# Patient Record
Sex: FEMALE | Race: ASIAN | NOT HISPANIC OR LATINO
[De-identification: names, ages, dates, MRNs, and addresses within clinical notes are randomized per-mention and may not be internally consistent; named-entity substitution may affect disease eponyms.]

---

## 2020-05-26 ENCOUNTER — APPOINTMENT (OUTPATIENT)
Dept: UROLOGY | Facility: CLINIC | Age: 50
End: 2020-05-26
Payer: COMMERCIAL

## 2020-05-26 VITALS
SYSTOLIC BLOOD PRESSURE: 114 MMHG | WEIGHT: 110 LBS | HEIGHT: 60 IN | BODY MASS INDEX: 21.6 KG/M2 | DIASTOLIC BLOOD PRESSURE: 72 MMHG | HEART RATE: 94 BPM | RESPIRATION RATE: 18 BRPM | TEMPERATURE: 97.5 F

## 2020-05-26 DIAGNOSIS — Z78.9 OTHER SPECIFIED HEALTH STATUS: ICD-10-CM

## 2020-05-26 LAB
APPEARANCE: CLEAR
BACTERIA: NEGATIVE
BILIRUBIN URINE: NEGATIVE
BLOOD URINE: NEGATIVE
COLOR: COLORLESS
GLUCOSE QUALITATIVE U: NEGATIVE
HYALINE CASTS: 0 /LPF
KETONES URINE: NEGATIVE
LEUKOCYTE ESTERASE URINE: NEGATIVE
MICROSCOPIC-UA: NORMAL
NITRITE URINE: NEGATIVE
PH URINE: 6.5
PROTEIN URINE: NEGATIVE
RED BLOOD CELLS URINE: 1 /HPF
SPECIFIC GRAVITY URINE: 1
SQUAMOUS EPITHELIAL CELLS: 1 /HPF
UROBILINOGEN URINE: NORMAL
WHITE BLOOD CELLS URINE: 1 /HPF

## 2020-05-26 PROCEDURE — 99204 OFFICE O/P NEW MOD 45 MIN: CPT | Mod: 25

## 2020-05-26 PROCEDURE — 76775 US EXAM ABDO BACK WALL LIM: CPT

## 2020-05-26 RX ORDER — METHENAMINE HIPPURATE 1 G/1
1 TABLET ORAL
Qty: 180 | Refills: 3 | Status: ACTIVE | COMMUNITY
Start: 2020-05-26 | End: 1900-01-01

## 2020-05-26 RX ORDER — AZELASTINE HYDROCHLORIDE 0.5 MG/ML
0.05 SOLUTION/ DROPS OPHTHALMIC
Qty: 6 | Refills: 0 | Status: ACTIVE | COMMUNITY
Start: 2019-10-07

## 2020-05-26 NOTE — PHYSICAL EXAM
[General Appearance - Well Developed] : well developed [General Appearance - Well Nourished] : well nourished [Normal Appearance] : normal appearance [Well Groomed] : well groomed [General Appearance - In No Acute Distress] : no acute distress [Edema] : no peripheral edema [Respiration, Rhythm And Depth] : normal respiratory rhythm and effort [Exaggerated Use Of Accessory Muscles For Inspiration] : no accessory muscle use [Abdomen Tenderness] : non-tender [Abdomen Soft] : soft [Costovertebral Angle Tenderness] : no ~M costovertebral angle tenderness [Normal Station and Gait] : the gait and station were normal for the patient's age [Urinary Bladder Findings] : the bladder was normal on palpation [] : no rash [No Focal Deficits] : no focal deficits [Oriented To Time, Place, And Person] : oriented to person, place, and time [Affect] : the affect was normal [Mood] : the mood was normal [Not Anxious] : not anxious [No Palpable Adenopathy] : no palpable adenopathy

## 2020-05-26 NOTE — ADDENDUM
[FreeTextEntry1] : Entered by Oliver Zelaya, acting as scribe for Dr. Geoffrey Eid.\par \par The documentation recorded by the scribe accurately reflects the service I personally performed and the decisions made by me.

## 2020-05-26 NOTE — LETTER BODY
[FreeTextEntry1] : Kye GUILLORY Ma, MD\par [address] [facility] [address]\[place] [address]\par [phone]\par \par Dear Dr. Reynolds,\par \par Reason for Visit: Recurrent UTI\par \par This is a 50 year-old working woman with history of COVID-19, presenting with UTI. Patient is referred for evaluation of her condition. Patient reports recurrent UTI. She has had 2 urinary tract infection the past 6 months. The course has been intermittent. The patient describes urethral discomfort. The location of the discomfort is urethra. She complains of urinary frequency. She has not tried vaginal estrogen cream previously. She is not sexually active. She reports of normal menstrual periods. Currently, patient denies any gross hematuria or urinary incontinence. The patient denies any aggravating or relieving factors. The patient denies any interference of function. The patient is entirely asymptomatic. All other review of systems are negative. She has no cancer in her family medical history. She has previously undergone appendectomy and  section.Past medical history, family history and social history were inquired and were noncontributory to current condition. The patient does not use tobacco or drink alcohol. Medications and allergies were reviewed. She is allergic to Sulfa drugs.\par \par On examination, the patient is a healthy-appearing woman in no acute distress. She is alert and oriented follows commands. She  has normal mood and affect. She is normocephalic. Oral no thrush. Neck is supple. Respirations are unlabored. Abdomen is soft and nontender. Liver is nonpalpable. Bladder is nonpalpable. No CVA tenderness. Neurologically she is grossly intact. No peripheral edema. Skin without gross abnormality.\par \par Assessment: Recurrent UTI. Left hydronephrosis.\par \par I counseled the patient. I discussed the various etiologies of her symptoms. I recommend she obtain urinalysis, urine culture, chlamydia testing, and ureaplasma/mycoplasma today to evaluate for the source of her recurrent UTI. I also recommend the patient begin a trial of Methenamine Hippurate and Vitamin C to prevent future UTI. I discussed the potential side effects of the medications. I counseled the patient on their uses and side effects. If the patient develops any side effects, the patient will discontinue the medication and contact me. Patient will proceed with renal ultrasound today. Risks and alternatives were discussed. I answered the patient questions. The patient will follow-up as directed and will contact me with any questions or concerns. Thank you for the opportunity to participate in the care of Ms. SMITH. I will keep you updated on her progress. \par \par Plan: Trial of Methenamine Hippurate and Vitamin C. Urinalysis. Culture. Chlamydia/GC amplification. Ureaplasma/Mycoplasma. Renal ultrasound. CT abdomen and pelvis. Follow up in 3 months.\par \par I personally reviewed renal ultrasound with the patient today. Images demonstrated mild dilated collecting system visualized in the left kidney, left ureteral jet visualized. Both kidneys appear normal in size and echogenicity without any stones or solid masses visualized.\par \par Her renal ultrasound today demonstrated evidence of left hydronephrosis. I recommend she obtain CT abdomen and pelvis to evaluate for the source of her hydronephrosis.

## 2020-05-27 LAB
BACTERIA UR CULT: NORMAL
C TRACH RRNA SPEC QL NAA+PROBE: NOT DETECTED
N GONORRHOEA RRNA SPEC QL NAA+PROBE: NOT DETECTED
SOURCE AMPLIFICATION: NORMAL

## 2020-06-06 LAB
MYCOPLASMA HOMINIS CULTURE: NORMAL
UREA SPECIMEN SOURCE: NORMAL
UREAPLASMA CULTURE: NORMAL
UREAPLASMA, PRELIMINARY CULTURE: NORMAL

## 2020-06-30 ENCOUNTER — APPOINTMENT (OUTPATIENT)
Dept: UROLOGY | Facility: CLINIC | Age: 50
End: 2020-06-30
Payer: COMMERCIAL

## 2020-06-30 VITALS
BODY MASS INDEX: 21.29 KG/M2 | SYSTOLIC BLOOD PRESSURE: 102 MMHG | DIASTOLIC BLOOD PRESSURE: 66 MMHG | HEART RATE: 82 BPM | TEMPERATURE: 98 F | WEIGHT: 109 LBS | RESPIRATION RATE: 18 BRPM | OXYGEN SATURATION: 97 %

## 2020-06-30 LAB
APPEARANCE: CLEAR
BACTERIA: NEGATIVE
BILIRUBIN URINE: NEGATIVE
BLOOD URINE: NEGATIVE
COLOR: COLORLESS
GLUCOSE QUALITATIVE U: NEGATIVE
HYALINE CASTS: 0 /LPF
KETONES URINE: NEGATIVE
LEUKOCYTE ESTERASE URINE: ABNORMAL
MICROSCOPIC-UA: NORMAL
NITRITE URINE: NEGATIVE
PH URINE: 6.5
PROTEIN URINE: NEGATIVE
RED BLOOD CELLS URINE: 4 /HPF
SPECIFIC GRAVITY URINE: 1.01
SQUAMOUS EPITHELIAL CELLS: 2 /HPF
UROBILINOGEN URINE: NORMAL
WHITE BLOOD CELLS URINE: 1 /HPF

## 2020-06-30 PROCEDURE — 99213 OFFICE O/P EST LOW 20 MIN: CPT

## 2020-06-30 NOTE — LETTER BODY
[FreeTextEntry1] : Kye GUILLORY Ma, MD\par [address] [facility] [address]\[place] [address]\par [phone]\par \par Dear Dr. Reynolds,\par \par Reason for Visit: Recurrent UTI\par \par This is a 50 year-old working woman with history of COVID-19, presenting with UTI. Patient is referred for evaluation of her condition. Patient reports recurrent UTI. She has had 2 urinary tract infection the past 6 months. The course has been intermittent. The patient describes urethral discomfort. The location of the discomfort is urethra. She complains of urinary frequency. She has not tried vaginal estrogen cream previously. She is not sexually active. She reports of normal menstrual periods. Currently, patient denies any gross hematuria or urinary incontinence. The patient denies any aggravating or relieving factors. She returns for followup after completing her CT scan. The patient denies any interference of function. The patient is entirely asymptomatic. All other review of systems are negative. She has no cancer in her family medical history. She has previously undergone appendectomy and  section.Past medical history, family history and social history were inquired and were noncontributory to current condition. The patient does not use tobacco or drink alcohol. Medications and allergies were reviewed. She is allergic to Sulfa drugs.\par \par On examination, the patient is a healthy-appearing woman in no acute distress. She is alert and oriented follows commands. She has normal mood and affect. She is normocephalic. Oral no thrush. Neck is supple. Respirations are unlabored. Abdomen is soft and nontender. Liver is nonpalpable. Bladder is nonpalpable. No CVA tenderness. Neurologically she is grossly intact. No peripheral edema. Skin without gross abnormality.\par \par Her recent urinalysis was unremarkable.\par \par I personally reviewed the CT scan with patient today. CT scan demonstrated malrotated right kidney. There is no hydronephrosis. There is a fibroid uterus. \par \par Assessment: Recurrent UTI. Malrotated right kidney. \par \par I counseled the patient. I discussed the various etiologies of her symptoms. I recommend she repeat urinalysis. I also recommend the patient continue methenamine Hippurate and Vitamin C to prevent future UTI. I discussed the potential side effects of the medications. I counseled the patient on their uses and side effects. If the patient develops any side effects, the patient will discontinue the medication and contact me. Patient will proceed with renal ultrasound today. Risks and alternatives were discussed. I answered the patient questions. The patient will follow-up as directed and will contact me with any questions or concerns. Thank you for the opportunity to participate in the care of Ms. SMITH. I will keep you updated on her progress. \par \par Plan: Continue Methenamine Hippurate and Vitamin C. Urinalysis. Culture. Chlamydia/GC amplification. Ureaplasma/Mycoplasma. Renal ultrasound. Followup in 6 months. \par

## 2020-07-01 LAB — BACTERIA UR CULT: NORMAL

## 2020-10-27 ENCOUNTER — APPOINTMENT (OUTPATIENT)
Dept: UROLOGY | Facility: CLINIC | Age: 50
End: 2020-10-27
Payer: COMMERCIAL

## 2020-10-27 VITALS
RESPIRATION RATE: 18 BRPM | TEMPERATURE: 97.6 F | SYSTOLIC BLOOD PRESSURE: 106 MMHG | WEIGHT: 112 LBS | HEART RATE: 104 BPM | BODY MASS INDEX: 21.87 KG/M2 | OXYGEN SATURATION: 95 % | DIASTOLIC BLOOD PRESSURE: 69 MMHG

## 2020-10-27 DIAGNOSIS — A49.9 URINARY TRACT INFECTION, SITE NOT SPECIFIED: ICD-10-CM

## 2020-10-27 DIAGNOSIS — N39.0 URINARY TRACT INFECTION, SITE NOT SPECIFIED: ICD-10-CM

## 2020-10-27 PROCEDURE — 99072 ADDL SUPL MATRL&STAF TM PHE: CPT

## 2020-10-27 PROCEDURE — 99214 OFFICE O/P EST MOD 30 MIN: CPT

## 2020-10-27 NOTE — LETTER BODY
[FreeTextEntry1] : Kye GUILLORY Ma, MD\par [address] [facility] [address]\[place] [address]\par [phone]\par \par Dear Dr. Reynolds,\par \par Reason for Visit: Recurrent UTI. Malrotated right kidney. Pyuria.\par \par This is a 50 year-old working woman with history of COVID-19, malrotated right kidney and recurrent UTI, presenting with pyuria. Her CT scan in June demonstrated malrotated right kidney without hydronephrosis, and a fibroid uterus. She returns today for follow-up. Since the patient's last visit, she obtained a urinalysis, which demonstrated no evidence of hematuria. However, on urine microscopy there was evidence of white blood cells. Patient is entirely asymptomatic. She denies any gross hematuria, dysuria or urinary incontinence. The patient reports being stressed recently due to her job. All other review of systems are negative. Past medical history, family history and social history were unchanged. Medications and allergies were reviewed. She is allergic to Sulfa drugs.\par \par On examination, the patient is a healthy-appearing woman in no acute distress. She is alert and oriented follows commands. She has normal mood and affect. She is normocephalic. Oral no thrush. Neck is supple. Respirations are unlabored. Abdomen is soft and nontender. Liver is nonpalpable. Bladder is nonpalpable. No CVA tenderness. Neurologically she is grossly intact. No peripheral edema. Skin without gross abnormality.\par \par Her recent urinalysis demonstrated 10-20 WBC/HPF.\par \par Assessment: Recurrent UTI. Malrotated right kidney. Pyuria.\par \par I counseled the patient on the various etiologies of the pyuria. I discussed the risk. Given the absence of urinary symptoms, I recommended the patient repeat the urinalysis and obtain a urine culture prior to treatment. I also recommended she obtain chlamydia/GC amplification, quantiferon plus TB, and ureaplasma/mycoplasma genital culture for further evaluation of her condition. In terms of her recurrent UTI, I recommended she continue taking prophylactic Methenamine Hippurate and Vitamin C to prevent future infection. Risks and alternatives were discussed. I answered the patient questions. The patient will follow-up as directed and will contact me with any questions or concerns. Thank you for the opportunity to participate in the care of Ms. SMITH. I will keep you updated on her progress. \par \par Plan: Continue Methenamine Hippurate and Vitamin C. Urinalysis. Culture. Quantiferon plus TB. Chlamydia/GC amplification. Ureaplasma/Mycoplasma. Follow-up as directed.

## 2020-10-27 NOTE — ADDENDUM
[FreeTextEntry1] : Entered by Praveen Redding, acting as scribe for Dr. Geoffrey Eid.\par \par The documentation recorded by the scribe accurately reflects the service I personally performed and the decisions made by me.\par

## 2020-10-28 LAB
C TRACH RRNA SPEC QL NAA+PROBE: NOT DETECTED
N GONORRHOEA RRNA SPEC QL NAA+PROBE: NOT DETECTED
SOURCE AMPLIFICATION: NORMAL

## 2020-10-30 ENCOUNTER — NON-APPOINTMENT (OUTPATIENT)
Age: 50
End: 2020-10-30

## 2020-10-30 LAB
M TB IFN-G BLD-IMP: NEGATIVE
QUANTIFERON TB PLUS MITOGEN MINUS NIL: 8.17 IU/ML
QUANTIFERON TB PLUS NIL: 0.03 IU/ML
QUANTIFERON TB PLUS TB1 MINUS NIL: -0.01 IU/ML
QUANTIFERON TB PLUS TB2 MINUS NIL: -0.01 IU/ML

## 2020-11-01 LAB
APPEARANCE: ABNORMAL
BACTERIA UR CULT: NORMAL
BACTERIA: NEGATIVE
BILIRUBIN URINE: NEGATIVE
BLOOD URINE: NEGATIVE
COLOR: NORMAL
GLUCOSE QUALITATIVE U: NEGATIVE
HYALINE CASTS: 0 /LPF
KETONES URINE: NEGATIVE
LEUKOCYTE ESTERASE URINE: ABNORMAL
MICROSCOPIC-UA: NORMAL
NITRITE URINE: NEGATIVE
PH URINE: 6.5
PROTEIN URINE: NEGATIVE
RED BLOOD CELLS URINE: 2 /HPF
SPECIFIC GRAVITY URINE: 1.01
SQUAMOUS EPITHELIAL CELLS: 3 /HPF
UROBILINOGEN URINE: NORMAL
WHITE BLOOD CELLS URINE: 3 /HPF

## 2020-11-04 ENCOUNTER — NON-APPOINTMENT (OUTPATIENT)
Age: 50
End: 2020-11-04

## 2020-12-23 PROBLEM — N39.0 BACTERIAL UTI: Status: RESOLVED | Noted: 2020-05-26 | Resolved: 2020-12-23

## 2021-01-05 ENCOUNTER — APPOINTMENT (OUTPATIENT)
Dept: UROLOGY | Facility: CLINIC | Age: 51
End: 2021-01-05
Payer: COMMERCIAL

## 2021-01-05 ENCOUNTER — TRANSCRIPTION ENCOUNTER (OUTPATIENT)
Age: 51
End: 2021-01-05

## 2021-01-05 VITALS
WEIGHT: 110 LBS | BODY MASS INDEX: 21.48 KG/M2 | DIASTOLIC BLOOD PRESSURE: 65 MMHG | HEART RATE: 87 BPM | TEMPERATURE: 98.3 F | RESPIRATION RATE: 18 BRPM | OXYGEN SATURATION: 97 % | SYSTOLIC BLOOD PRESSURE: 106 MMHG

## 2021-01-05 PROCEDURE — 99213 OFFICE O/P EST LOW 20 MIN: CPT

## 2021-01-05 PROCEDURE — 99072 ADDL SUPL MATRL&STAF TM PHE: CPT

## 2021-01-05 NOTE — LETTER BODY
[FreeTextEntry1] : Kye GUILLORY Ma, MD\par [address] [facility] [address]\[place] [address]\par [phone]\par \par Dear Dr. Reynolds,\par \par Reason for Visit: Recurrent UTI. Malrotated right kidney. Pyuria.\par \par This is a 50 year-old working woman with history of COVID-19, malrotated right kidney, previous pyuria, and recurrent UTI. Her CT scan in June 2020 demonstrated malrotated right kidney without hydronephrosis, and a fibroid uterus. The patinet returns today for follow-up. Since she was last seen, the patient reports taking Methenamine hippurate and Vitamin C regularly without any side effects or difficulties. She denies any urinary complaints or difficulties. The patient reports that she is doing well. Patient is entirely asymptomatic. She denies any gross hematuria, dysuria or urinary incontinence. All other review of systems are negative. Past medical history, family history and social history were unchanged. Medications and allergies were reviewed. She is allergic to Sulfa drugs.\par \par On examination, the patient is a healthy-appearing woman in no acute distress. She is alert and oriented follows commands. She has normal mood and affect. She is normocephalic. Oral no thrush. Neck is supple. Respirations are unlabored. Abdomen is soft and nontender. Liver is nonpalpable. Bladder is nonpalpable. No CVA tenderness. Neurologically she is grossly intact. No peripheral edema. Skin without gross abnormality.\par \par Her urinalysis was unremarkable. Her urine culture was negative. Her chlamydia/GC amplification was negative. Her ureaplasma/mycoplasma genital culture was negative. Her quantiferon plus TB was negative.\par \par Assessment: Recurrent UTI. Malrotated right kidney. Pyuria, resolved.\par \par I counseled the patient. She is doing well. In terms of her recurrent UTI, I recommended she continue taking Methenamine hippurate and Vitamin C for 3 months, then she can discontinue the medication. She will repeat urine culture today to evaluate for infection. The patient will follow-up in 1 year to ensure stability. Risks and alternatives were discussed. I answered the patient questions. The patient will follow-up as directed and will contact me with any questions or concerns. Thank you for the opportunity to participate in the care of Ms. SMITH. I will keep you updated on her progress.\par \par Plan: Continue Methenamine Hippurate and Vitamin C for 3 months. Urine culture. Follow-up in 1 year.

## 2021-01-06 LAB — BACTERIA UR CULT: NORMAL

## 2021-12-17 ENCOUNTER — NON-APPOINTMENT (OUTPATIENT)
Age: 51
End: 2021-12-17

## 2022-01-14 ENCOUNTER — APPOINTMENT (OUTPATIENT)
Dept: UROLOGY | Facility: CLINIC | Age: 52
End: 2022-01-14
Payer: COMMERCIAL

## 2022-01-14 VITALS
TEMPERATURE: 97.8 F | RESPIRATION RATE: 18 BRPM | BODY MASS INDEX: 22.07 KG/M2 | DIASTOLIC BLOOD PRESSURE: 65 MMHG | HEART RATE: 73 BPM | WEIGHT: 113 LBS | OXYGEN SATURATION: 95 % | SYSTOLIC BLOOD PRESSURE: 112 MMHG

## 2022-01-14 DIAGNOSIS — U07.1 COVID-19: ICD-10-CM

## 2022-01-14 DIAGNOSIS — N13.30 UNSPECIFIED HYDRONEPHROSIS: ICD-10-CM

## 2022-01-14 DIAGNOSIS — R35.0 FREQUENCY OF MICTURITION: ICD-10-CM

## 2022-01-14 DIAGNOSIS — Z00.00 ENCOUNTER FOR GENERAL ADULT MEDICAL EXAMINATION W/OUT ABNORMAL FINDINGS: ICD-10-CM

## 2022-01-14 PROCEDURE — 99213 OFFICE O/P EST LOW 20 MIN: CPT

## 2022-01-14 NOTE — ADDENDUM
[FreeTextEntry1] : Entered by Harsh Dorsey, acting as scribe for Dr. Geoffrey Eid.\par \par The documentation recorded by the scribe accurately reflects the service I personally performed and the decisions made by me.

## 2022-01-14 NOTE — HISTORY OF PRESENT ILLNESS
[FreeTextEntry1] : Please refer to URO Consult note \par \par Patient returns today for follow-up.  She is concerned about possible urinary tract infection.  She is completely asymptomatic.  She has been off methenamine for over a year.  \par Urinalysis demonstrated evidence of no pyuria or hematuria.\par I reassured the patient.  Encourage hydration.  Repeat urinalysis.  Follow-up as directed.

## 2022-01-14 NOTE — LETTER BODY
[FreeTextEntry1] : Kye GUILLORY Ma, MD\par [address] [facility] [address]\[place] [address]\par [phone]\par \par Dear Dr. Reynolds,\par \par Reason for Visit: Recurrent UTI. Malrotated right kidney. Previous pyuria.\par \par This is a 51 year-old working woman with history of COVID-19, malrotated right kidney, previous pyuria, and recurrent UTI. Her CT scan in June 2020 demonstrated malrotated right kidney without hydronephrosis, and a fibroid uterus. The patient returns today for follow-up. Since she was last seen, the patient reports she has discontinued Methenamine hippurate and Vitamin C for over a year. The patient expresses concern about possible UTI.  She is entirely asymptomatic.  The patient denies any hematuria, dysuria or urinary incontinence.  All other review of systems are negative. Past medical history, family history and social history were unchanged. Medications and allergies were reviewed. She is allergic to Sulfa drugs.\par \par On examination, the patient is a healthy-appearing woman in no acute distress. She is alert and oriented follows commands. She has normal mood and affect. She is normocephalic. Oral no thrush. Neck is supple. Respirations are unlabored. Abdomen is soft and nontender. Liver is nonpalpable. Bladder is nonpalpable. No CVA tenderness. Neurologically she is grossly intact. No peripheral edema. Skin without gross abnormality.\par \par Her urine culture in January 2021 was negative. \par \par Assessment: Recurrent UTI. Malrotated right kidney. \par \par I counseled the patient.  In terms of her recurrent UTI, her previous urinalysis was negative for any pyuria or hematuria.  I reassured the patient. I recommended the patient repeat urinalysis and urine culture today to evaluate for infection. I encouraged hydration. Patient understands that if she develops gross hematuria or any urinary discomfort, she will contact me for further evaluation. Risks and alternatives were discussed. I answered the patient questions. The patient will follow-up as directed and will contact me with any questions or concerns. Thank you for the opportunity to participate in the care of Ms. SMITH. I will keep you updated on her progress. \par \par Plan: Urinalysis. Urine culture. Follow-up as directed.

## 2022-01-15 LAB
APPEARANCE: CLEAR
BACTERIA: NEGATIVE
BILIRUBIN URINE: NEGATIVE
BLOOD URINE: NEGATIVE
COLOR: YELLOW
GLUCOSE QUALITATIVE U: NEGATIVE
HYALINE CASTS: 0 /LPF
KETONES URINE: NEGATIVE
LEUKOCYTE ESTERASE URINE: NEGATIVE
MICROSCOPIC-UA: NORMAL
NITRITE URINE: NEGATIVE
PH URINE: 6.5
PROTEIN URINE: NEGATIVE
RED BLOOD CELLS URINE: 3 /HPF
SPECIFIC GRAVITY URINE: 1.01
SQUAMOUS EPITHELIAL CELLS: 1 /HPF
UROBILINOGEN URINE: NORMAL
WHITE BLOOD CELLS URINE: 1 /HPF

## 2022-01-16 LAB — BACTERIA UR CULT: NORMAL

## 2022-01-20 ENCOUNTER — NON-APPOINTMENT (OUTPATIENT)
Age: 52
End: 2022-01-20

## 2022-01-20 ENCOUNTER — APPOINTMENT (OUTPATIENT)
Dept: OBGYN | Facility: CLINIC | Age: 52
End: 2022-01-20
Payer: COMMERCIAL

## 2022-01-20 VITALS
DIASTOLIC BLOOD PRESSURE: 73 MMHG | SYSTOLIC BLOOD PRESSURE: 116 MMHG | HEIGHT: 60 IN | BODY MASS INDEX: 22.19 KG/M2 | WEIGHT: 113 LBS

## 2022-01-20 DIAGNOSIS — Z80.49 FAMILY HISTORY OF MALIGNANT NEOPLASM OF OTHER GENITAL ORGANS: ICD-10-CM

## 2022-01-20 DIAGNOSIS — N39.0 URINARY TRACT INFECTION, SITE NOT SPECIFIED: ICD-10-CM

## 2022-01-20 PROCEDURE — 82270 OCCULT BLOOD FECES: CPT

## 2022-01-20 PROCEDURE — 58120 DILATION AND CURETTAGE: CPT

## 2022-01-20 PROCEDURE — 99386 PREV VISIT NEW AGE 40-64: CPT | Mod: 25

## 2022-01-20 RX ORDER — BROMPHENIRAMINE MALEATE, PSEUDOEPHEDRINE HYDROCHLORIDE, 2; 30; 10 MG/5ML; MG/5ML; MG/5ML
30-2-10 SYRUP ORAL
Qty: 200 | Refills: 0 | Status: DISCONTINUED | COMMUNITY
Start: 2020-04-20 | End: 2022-01-20

## 2022-01-20 RX ORDER — ALBUTEROL SULFATE 90 UG/1
108 (90 BASE) AEROSOL, METERED RESPIRATORY (INHALATION)
Qty: 18 | Refills: 0 | Status: DISCONTINUED | COMMUNITY
Start: 2020-03-25 | End: 2022-01-20

## 2022-01-20 RX ORDER — CIPROFLOXACIN HYDROCHLORIDE 500 MG/1
500 TABLET, FILM COATED ORAL
Qty: 14 | Refills: 0 | Status: DISCONTINUED | COMMUNITY
Start: 2020-05-19 | End: 2022-01-20

## 2022-01-20 RX ORDER — BENZONATATE 100 MG/1
100 CAPSULE ORAL
Qty: 20 | Refills: 0 | Status: DISCONTINUED | COMMUNITY
Start: 2020-04-07 | End: 2022-01-20

## 2022-01-26 LAB
CYTOLOGY CVX/VAG DOC THIN PREP: NORMAL
ESTRADIOL SERPL-MCNC: 177 PG/ML
FSH SERPL-MCNC: 3.2 IU/L
HPV HIGH+LOW RISK DNA PNL CVX: NOT DETECTED
LH SERPL-ACNC: 5.2 IU/L
T4 FREE SERPL-MCNC: 1.1 NG/DL
TSH SERPL-ACNC: 1.08 UIU/ML

## 2022-01-26 RX ORDER — MISOPROSTOL 200 UG/1
200 TABLET ORAL
Qty: 2 | Refills: 0 | Status: ACTIVE | COMMUNITY
Start: 2022-01-26 | End: 1900-01-01

## 2022-02-15 ENCOUNTER — APPOINTMENT (OUTPATIENT)
Dept: OBGYN | Facility: CLINIC | Age: 52
End: 2022-02-15
Payer: COMMERCIAL

## 2022-02-15 ENCOUNTER — ASOB RESULT (OUTPATIENT)
Age: 52
End: 2022-02-15

## 2022-02-15 PROCEDURE — 77080 DXA BONE DENSITY AXIAL: CPT

## 2022-02-15 PROCEDURE — 76830 TRANSVAGINAL US NON-OB: CPT

## 2022-02-18 ENCOUNTER — NON-APPOINTMENT (OUTPATIENT)
Age: 52
End: 2022-02-18

## 2022-03-24 ENCOUNTER — APPOINTMENT (OUTPATIENT)
Dept: OBGYN | Facility: CLINIC | Age: 52
End: 2022-03-24
Payer: COMMERCIAL

## 2022-03-24 ENCOUNTER — ASOB RESULT (OUTPATIENT)
Age: 52
End: 2022-03-24

## 2022-03-24 LAB — HCG UR QL: NEGATIVE

## 2022-03-24 PROCEDURE — 76856 US EXAM PELVIC COMPLETE: CPT

## 2022-03-24 PROCEDURE — 58100 BIOPSY OF UTERUS LINING: CPT

## 2022-04-06 ENCOUNTER — APPOINTMENT (OUTPATIENT)
Dept: OBGYN | Facility: CLINIC | Age: 52
End: 2022-04-06
Payer: COMMERCIAL

## 2022-04-06 ENCOUNTER — ASOB RESULT (OUTPATIENT)
Age: 52
End: 2022-04-06

## 2022-04-06 DIAGNOSIS — N93.9 ABNORMAL UTERINE AND VAGINAL BLEEDING, UNSPECIFIED: ICD-10-CM

## 2022-04-06 PROCEDURE — 76831 ECHO EXAM UTERUS: CPT

## 2022-04-06 PROCEDURE — 58340 CATHETER FOR HYSTEROGRAPHY: CPT

## 2023-01-23 ENCOUNTER — APPOINTMENT (OUTPATIENT)
Dept: OBGYN | Facility: CLINIC | Age: 53
End: 2023-01-23
Payer: COMMERCIAL

## 2023-01-23 VITALS
DIASTOLIC BLOOD PRESSURE: 72 MMHG | HEIGHT: 60 IN | HEART RATE: 85 BPM | WEIGHT: 107 LBS | BODY MASS INDEX: 21.01 KG/M2 | SYSTOLIC BLOOD PRESSURE: 114 MMHG

## 2023-01-23 DIAGNOSIS — M85.80 OTHER SPECIFIED DISORDERS OF BONE DENSITY AND STRUCTURE, UNSPECIFIED SITE: ICD-10-CM

## 2023-01-23 PROCEDURE — 82270 OCCULT BLOOD FECES: CPT

## 2023-01-23 PROCEDURE — 99396 PREV VISIT EST AGE 40-64: CPT

## 2023-01-24 LAB — HPV HIGH+LOW RISK DNA PNL CVX: NOT DETECTED

## 2023-01-25 ENCOUNTER — ASOB RESULT (OUTPATIENT)
Age: 53
End: 2023-01-25

## 2023-01-25 ENCOUNTER — APPOINTMENT (OUTPATIENT)
Dept: OBGYN | Facility: CLINIC | Age: 53
End: 2023-01-25
Payer: COMMERCIAL

## 2023-01-25 PROCEDURE — 76830 TRANSVAGINAL US NON-OB: CPT

## 2023-01-25 PROCEDURE — 76856 US EXAM PELVIC COMPLETE: CPT | Mod: 59

## 2023-01-26 LAB — CYTOLOGY CVX/VAG DOC THIN PREP: NORMAL

## 2024-01-31 ENCOUNTER — APPOINTMENT (OUTPATIENT)
Dept: OBGYN | Facility: CLINIC | Age: 54
End: 2024-01-31
Payer: COMMERCIAL

## 2024-01-31 VITALS
WEIGHT: 110 LBS | BODY MASS INDEX: 21.6 KG/M2 | DIASTOLIC BLOOD PRESSURE: 64 MMHG | HEIGHT: 60 IN | SYSTOLIC BLOOD PRESSURE: 103 MMHG

## 2024-01-31 DIAGNOSIS — D21.9 BENIGN NEOPLASM OF CONNECTIVE AND OTHER SOFT TISSUE, UNSPECIFIED: ICD-10-CM

## 2024-01-31 DIAGNOSIS — N95.2 POSTMENOPAUSAL ATROPHIC VAGINITIS: ICD-10-CM

## 2024-01-31 PROCEDURE — 82270 OCCULT BLOOD FECES: CPT

## 2024-01-31 PROCEDURE — 99396 PREV VISIT EST AGE 40-64: CPT

## 2024-01-31 RX ORDER — ESTRADIOL 10 UG/1
10 TABLET, FILM COATED VAGINAL
Qty: 24 | Refills: 3 | Status: ACTIVE | COMMUNITY
Start: 2024-01-31 | End: 1900-01-01

## 2024-01-31 RX ORDER — ESTRADIOL 0.1 MG/G
0.1 CREAM VAGINAL
Qty: 1 | Refills: 3 | Status: ACTIVE | COMMUNITY
Start: 2024-01-31 | End: 1900-01-01

## 2024-02-01 LAB — HPV HIGH+LOW RISK DNA PNL CVX: NOT DETECTED

## 2024-02-07 LAB — CYTOLOGY CVX/VAG DOC THIN PREP: ABNORMAL

## 2024-02-13 ENCOUNTER — APPOINTMENT (OUTPATIENT)
Dept: OBGYN | Facility: CLINIC | Age: 54
End: 2024-02-13

## 2024-02-18 ENCOUNTER — NON-APPOINTMENT (OUTPATIENT)
Age: 54
End: 2024-02-18

## 2024-02-23 ENCOUNTER — ASOB RESULT (OUTPATIENT)
Age: 54
End: 2024-02-23

## 2024-02-23 ENCOUNTER — APPOINTMENT (OUTPATIENT)
Dept: OBGYN | Facility: CLINIC | Age: 54
End: 2024-02-23
Payer: COMMERCIAL

## 2024-02-23 PROCEDURE — 76830 TRANSVAGINAL US NON-OB: CPT

## 2024-03-19 ENCOUNTER — APPOINTMENT (OUTPATIENT)
Dept: OBGYN | Facility: CLINIC | Age: 54
End: 2024-03-19
Payer: COMMERCIAL

## 2024-03-19 VITALS — SYSTOLIC BLOOD PRESSURE: 105 MMHG | DIASTOLIC BLOOD PRESSURE: 64 MMHG

## 2024-03-19 DIAGNOSIS — Z01.419 ENCOUNTER FOR GYNECOLOGICAL EXAMINATION (GENERAL) (ROUTINE) W/OUT ABNORMAL FINDINGS: ICD-10-CM

## 2024-03-19 DIAGNOSIS — R87.615 UNSATISFACTORY CYTOLOGIC SMEAR OF CERVIX: ICD-10-CM

## 2024-03-19 PROCEDURE — 99212 OFFICE O/P EST SF 10 MIN: CPT

## 2024-03-19 NOTE — HISTORY OF PRESENT ILLNESS
[FreeTextEntry1] : 03/19/2024. JESSY SMITH 54 year old female presents for a f/u gyn visit for a re-pap   She feels well and offers no complaints.

## 2024-03-19 NOTE — PLAN
[FreeTextEntry1] : 54 year old female pt presents for a re-pap  RTO in 1 year for annual or PRN       This note was written by Rickie March on 03/19/2024 acting as scribe for Dr. Makayla Duvall   I,  Dr. Makayla Duvall, have read and attest that all the information, medical decision making and medical instructions within are true and accurate.

## 2024-03-19 NOTE — REASON FOR VISIT
[Follow-Up] : a follow-up evaluation of Pt on aspirin 81 mg , advised by cardio to stop aspirin 5 days prior to surgery

## 2024-03-20 LAB — HPV HIGH+LOW RISK DNA PNL CVX: NOT DETECTED

## 2024-03-22 LAB — CYTOLOGY CVX/VAG DOC THIN PREP: NORMAL

## 2025-01-02 ENCOUNTER — NON-APPOINTMENT (OUTPATIENT)
Age: 55
End: 2025-01-02

## 2025-01-03 ENCOUNTER — APPOINTMENT (OUTPATIENT)
Dept: PULMONOLOGY | Facility: CLINIC | Age: 55
End: 2025-01-03
Payer: COMMERCIAL

## 2025-01-03 ENCOUNTER — NON-APPOINTMENT (OUTPATIENT)
Age: 55
End: 2025-01-03

## 2025-01-03 VITALS
TEMPERATURE: 98 F | DIASTOLIC BLOOD PRESSURE: 78 MMHG | WEIGHT: 110 LBS | OXYGEN SATURATION: 98 % | BODY MASS INDEX: 21.6 KG/M2 | HEIGHT: 60 IN | SYSTOLIC BLOOD PRESSURE: 120 MMHG | HEART RATE: 82 BPM

## 2025-01-03 DIAGNOSIS — Z78.9 OTHER SPECIFIED HEALTH STATUS: ICD-10-CM

## 2025-01-03 DIAGNOSIS — Z80.1 FAMILY HISTORY OF MALIGNANT NEOPLASM OF TRACHEA, BRONCHUS AND LUNG: ICD-10-CM

## 2025-01-03 DIAGNOSIS — R93.89 ABNORMAL FINDINGS ON DIAGNOSTIC IMAGING OF OTHER SPECIFIED BODY STRUCTURES: ICD-10-CM

## 2025-01-03 PROCEDURE — 99203 OFFICE O/P NEW LOW 30 MIN: CPT

## 2025-01-03 RX ORDER — CELECOXIB 200 MG/1
200 CAPSULE ORAL
Refills: 0 | Status: ACTIVE | COMMUNITY

## 2025-01-09 ENCOUNTER — APPOINTMENT (OUTPATIENT)
Dept: UROLOGY | Facility: CLINIC | Age: 55
End: 2025-01-09
Payer: COMMERCIAL

## 2025-01-09 DIAGNOSIS — R35.0 FREQUENCY OF MICTURITION: ICD-10-CM

## 2025-01-09 PROCEDURE — G2211 COMPLEX E/M VISIT ADD ON: CPT | Mod: NC

## 2025-01-09 PROCEDURE — 99213 OFFICE O/P EST LOW 20 MIN: CPT

## 2025-01-10 LAB
APPEARANCE: CLEAR
BACTERIA: NEGATIVE /HPF
BILIRUBIN URINE: NEGATIVE
BLOOD URINE: NEGATIVE
CAST: 0 /LPF
COLOR: YELLOW
EPITHELIAL CELLS: 0 /HPF
GLUCOSE QUALITATIVE U: NEGATIVE MG/DL
KETONES URINE: NEGATIVE MG/DL
LEUKOCYTE ESTERASE URINE: NEGATIVE
MICROSCOPIC-UA: NORMAL
NITRITE URINE: NEGATIVE
PH URINE: 7.5
PROTEIN URINE: NEGATIVE MG/DL
RED BLOOD CELLS URINE: 0 /HPF
SPECIFIC GRAVITY URINE: 1.01
UROBILINOGEN URINE: 0.2 MG/DL
WHITE BLOOD CELLS URINE: 0 /HPF

## 2025-01-12 LAB — BACTERIA UR CULT: NORMAL

## 2025-02-03 ENCOUNTER — APPOINTMENT (OUTPATIENT)
Dept: OBGYN | Facility: CLINIC | Age: 55
End: 2025-02-03
Payer: COMMERCIAL

## 2025-02-03 VITALS
BODY MASS INDEX: 17.11 KG/M2 | WEIGHT: 109 LBS | HEIGHT: 67 IN | SYSTOLIC BLOOD PRESSURE: 119 MMHG | DIASTOLIC BLOOD PRESSURE: 71 MMHG

## 2025-02-03 DIAGNOSIS — D21.9 BENIGN NEOPLASM OF CONNECTIVE AND OTHER SOFT TISSUE, UNSPECIFIED: ICD-10-CM

## 2025-02-03 DIAGNOSIS — E04.1 NONTOXIC SINGLE THYROID NODULE: ICD-10-CM

## 2025-02-03 DIAGNOSIS — M85.80 OTHER SPECIFIED DISORDERS OF BONE DENSITY AND STRUCTURE, UNSPECIFIED SITE: ICD-10-CM

## 2025-02-03 DIAGNOSIS — Z01.419 ENCOUNTER FOR GYNECOLOGICAL EXAMINATION (GENERAL) (ROUTINE) W/OUT ABNORMAL FINDINGS: ICD-10-CM

## 2025-02-03 DIAGNOSIS — Z13.31 ENCOUNTER FOR SCREENING FOR DEPRESSION: ICD-10-CM

## 2025-02-03 PROCEDURE — 82270 OCCULT BLOOD FECES: CPT

## 2025-02-03 PROCEDURE — 99396 PREV VISIT EST AGE 40-64: CPT

## 2025-02-04 LAB — HPV HIGH+LOW RISK DNA PNL CVX: NOT DETECTED

## 2025-02-18 ENCOUNTER — ASOB RESULT (OUTPATIENT)
Age: 55
End: 2025-02-18

## 2025-02-18 ENCOUNTER — APPOINTMENT (OUTPATIENT)
Dept: OBGYN | Facility: CLINIC | Age: 55
End: 2025-02-18
Payer: COMMERCIAL

## 2025-02-18 DIAGNOSIS — M81.0 AGE-RELATED OSTEOPOROSIS W/OUT CURRENT PATHOLOGICAL FRACTURE: ICD-10-CM

## 2025-02-18 PROCEDURE — 76830 TRANSVAGINAL US NON-OB: CPT

## 2025-02-18 PROCEDURE — 77080 DXA BONE DENSITY AXIAL: CPT

## 2025-02-21 PROBLEM — M81.0 OSTEOPOROSIS, POST-MENOPAUSAL: Status: ACTIVE | Noted: 2025-02-21

## 2025-02-24 ENCOUNTER — APPOINTMENT (OUTPATIENT)
Dept: OBGYN | Facility: CLINIC | Age: 55
End: 2025-02-24
Payer: COMMERCIAL

## 2025-02-24 DIAGNOSIS — R87.615 UNSATISFACTORY CYTOLOGIC SMEAR OF CERVIX: ICD-10-CM

## 2025-03-04 LAB
CYTOLOGY CVX/VAG DOC THIN PREP: NORMAL
HPV HIGH+LOW RISK DNA PNL CVX: NOT DETECTED